# Patient Record
Sex: MALE | Race: WHITE | NOT HISPANIC OR LATINO | Employment: FULL TIME | URBAN - METROPOLITAN AREA
[De-identification: names, ages, dates, MRNs, and addresses within clinical notes are randomized per-mention and may not be internally consistent; named-entity substitution may affect disease eponyms.]

---

## 2017-10-20 ENCOUNTER — LAB REQUISITION (OUTPATIENT)
Dept: LAB | Facility: OTHER | Age: 27
End: 2017-10-20

## 2017-10-20 DIAGNOSIS — Z00.00 ANNUAL PHYSICAL EXAM: ICD-10-CM

## 2017-10-30 ENCOUNTER — LAB REQUISITION (OUTPATIENT)
Dept: LAB | Facility: OTHER | Age: 27
End: 2017-10-30

## 2017-10-30 DIAGNOSIS — Z77.011 EXPOSURE TO LEAD: ICD-10-CM

## 2017-10-30 PROCEDURE — 83655 ASSAY OF LEAD: CPT | Performed by: FAMILY MEDICINE

## 2017-11-01 LAB — LEAD BLD-MCNC: 1 UG/DL (ref 0–19)

## 2018-02-07 ENCOUNTER — TRANSCRIBE ORDERS (OUTPATIENT)
Dept: PHYSICAL THERAPY | Facility: CLINIC | Age: 28
End: 2018-02-07

## 2018-02-07 DIAGNOSIS — S46.912D STRAIN OF LEFT SHOULDER, SUBSEQUENT ENCOUNTER: Primary | ICD-10-CM

## 2018-02-08 ENCOUNTER — TREATMENT (OUTPATIENT)
Dept: PHYSICAL THERAPY | Facility: CLINIC | Age: 28
End: 2018-02-08

## 2018-02-08 DIAGNOSIS — S46.912D STRAIN OF LEFT SHOULDER, SUBSEQUENT ENCOUNTER: Primary | ICD-10-CM

## 2018-02-08 PROCEDURE — 97035 APP MDLTY 1+ULTRASOUND EA 15: CPT | Performed by: PHYSICAL THERAPIST

## 2018-02-08 PROCEDURE — 97001 PR PHYS THERAPY EVALUATION: CPT | Performed by: PHYSICAL THERAPIST

## 2018-02-08 PROCEDURE — 97110 THERAPEUTIC EXERCISES: CPT | Performed by: PHYSICAL THERAPIST

## 2018-02-08 NOTE — PATIENT INSTRUCTIONS
Handout given for HEP.  Discussed eval, POC, strain vs internal derangement, exercises and modalities with patient.

## 2018-02-08 NOTE — PROGRESS NOTES
Physical Therapy Initial Evaluation and Plan of Care    Time In  136  Time Out  208    Subjective Evaluation    History of Present Illness  Date of onset: 2018  Mechanism of injury: A gang box door fell and patient went to catch it , patient caught it with his arm extended overhead.      Patient Occupation: [employer]   Precautions and Work Restrictions: light dutyQuality of life: good    Pain  Current pain ratin  At worst pain ratin  Location: right superior shoulder  Quality: sharp  Relieving factors: rest  Aggravating factors: movement  Progression: improved    Hand dominance: right    Patient Goals  Patient goal: get back to normal           Objective       Palpation     Additional Palpation Details  TTP to left LHB tendon, SS tendon and AC joint.     Active Range of Motion   Left Shoulder   Flexion: 115 degrees with pain  Abduction: 68 degrees with pain  External rotation 0°: 37 degrees   Internal rotation BTB: Active internal rotation behind the back: WNL.     Strength/Myotome Testing     Left Shoulder     Planes of Motion   Flexion: 4   Abduction: 4   External rotation at 0°: 3+   Internal rotation at 0°: 4     Additional Strength Details  Pain with all planes    Tests     Left Shoulder   Positive active compression ([place]), empty can and Hawkin's.          Assessment & Plan     Assessment  Impairments: abnormal or restricted ROM, activity intolerance, impaired physical strength, lacks appropriate home exercise program and pain with function  Assessment details: Patient presents with c/o pain, TTP, limited ROM, decreased strength and positive special testing which is limiting his ability to perform full job duties and ADL'S.  Barriers to therapy: none  Prognosis: good  Prognosis details: STG's to be met by 2 weeks  1)  Independent with HEP  2)  Decrease pain by 50% or more  3)  AROM WNL for left shoulder    LTG's to be met by 4 weeks  1)  Independent with HEP progression  2)   Decrease pain by 75% or more  3)  Increase strength for left shoulder to 4+/5  4)  Negative special testing  5)  No TTP to left shoulder  6)  Patient to lift waist to shoulder up to 50 lbs  7)  Patient to push/pull up to 100 lbs  8)  Patient to return to ADL'S without limitations       Plan  Planned modality interventions: TENS, cryotherapy, ultrasound and iontophoresis  Planned therapy interventions: strengthening, stretching, therapeutic activities, home exercise program and manual therapy  Frequency: 3x week  Duration in weeks: 4  Treatment plan discussed with: patient        Manual Therapy:    0     mins  10537;  Therapeutic Exercise:    13     mins  04715;    Neuromuscular Renita:    0    mins  68460;    Therapeutic Activity:     0     mins  92439;     Gait Trainin     mins  24318;     Ultrasound:     8     mins  58087;    Work Hardening           0      mins 67140  Iontophoresis               2   mins 43829    Timed Treatment:   23   mins   Total Treatment:     32   mins    PT SIGNATURE: Garland Doyle, PT   DATE TREATMENT INITIATED: 2018    Initial Certification  Certification Period: 2018  I certify that the therapy services are furnished while this patient is under my care.  The services outlined above are required by this patient, and will be reviewed every 90 days.     PHYSICIAN: Kylah Lewis, APRN      DATE:     Please sign and return via fax to [phone].. Thank you, [facility].

## 2018-02-13 ENCOUNTER — TREATMENT (OUTPATIENT)
Dept: PHYSICAL THERAPY | Facility: CLINIC | Age: 28
End: 2018-02-13

## 2018-02-13 DIAGNOSIS — S46.912D STRAIN OF LEFT SHOULDER, SUBSEQUENT ENCOUNTER: Primary | ICD-10-CM

## 2018-02-13 PROCEDURE — 97110 THERAPEUTIC EXERCISES: CPT | Performed by: PHYSICAL THERAPIST

## 2018-02-13 NOTE — PROGRESS NOTES
"Physical Therapy Daily Progress Note            Subjective Evaluation    History of Present Illness    Subjective comment: patient reports 2-3/10 pain rating.  \"It is getting better\"       Objective   See Exercise, Manual, and Modality Logs for complete treatment.       Assessment & Plan     Assessment  Assessment details: Patient tolerated treatment well.  Was able to lift his arm overhead with minimal pain.  Still has considerable pain with MMT in ER.      Plan  Plan details: Progress as tolerated.                      Therapeutic Exercise:    30    mins  31299;       Timed Treatment:   30   mins   Total Treatment:     30   mins    Rebeka Cline, PT  Physical Therapist  "

## 2018-02-16 ENCOUNTER — TREATMENT (OUTPATIENT)
Dept: PHYSICAL THERAPY | Facility: CLINIC | Age: 28
End: 2018-02-16

## 2018-02-16 DIAGNOSIS — S46.912D STRAIN OF LEFT SHOULDER, SUBSEQUENT ENCOUNTER: Primary | ICD-10-CM

## 2018-02-16 PROCEDURE — 97035 APP MDLTY 1+ULTRASOUND EA 15: CPT | Performed by: PHYSICAL THERAPIST

## 2018-02-16 PROCEDURE — 97140 MANUAL THERAPY 1/> REGIONS: CPT | Performed by: PHYSICAL THERAPIST

## 2018-02-16 PROCEDURE — 97110 THERAPEUTIC EXERCISES: CPT | Performed by: PHYSICAL THERAPIST

## 2018-02-16 NOTE — PROGRESS NOTES
Physical Therapy Daily Progress Note    Time In 330  Time Out 411        Subjective  Patient reports that the shoulder is getting better, rates the pain at 1/10.    Objective   See Exercise, Manual, and Modality Logs for complete treatment.       Assessment/Plan  Subjective reports are much improved.  PROM was WNL for flexion, abduction and IR, but patient had tightness and some pain with ER.  Patient tolerated the progression of exercises without c/o pain in the shoulder.                     Manual Therapy:    8     mins  73265;  Therapeutic Exercise:    23     mins  02553;     Neuromuscular Renita:    0    mins  15908;    Therapeutic Activity:     0     mins  87185;     Gait Trainin     mins  23041;     Ultrasound:     8     mins  13178;    Work Hardening           0      mins 86621  Iontophoresis               2   mins 62825    Timed Treatment:   41   mins   Total Treatment:     41   mins    Garland Doyle, PT  Physical Therapist

## 2018-02-20 ENCOUNTER — TREATMENT (OUTPATIENT)
Dept: PHYSICAL THERAPY | Facility: CLINIC | Age: 28
End: 2018-02-20

## 2018-02-20 DIAGNOSIS — S46.912D STRAIN OF LEFT SHOULDER, SUBSEQUENT ENCOUNTER: Primary | ICD-10-CM

## 2018-02-20 PROCEDURE — 97110 THERAPEUTIC EXERCISES: CPT | Performed by: PHYSICAL THERAPIST

## 2018-02-20 NOTE — PROGRESS NOTES
Re-Assessment / Re-Certification        Patient: Spike Delgado   : 1990  Diagnosis/ICD-10 Code:  Strain of left shoulder, subsequent encounter [S46.912D]  Referring practitioner: Kylah Lewis,*  Date of Initial Visit: 2018  Today's Date: 2018  Patient seen for 4 sessions      Subjective:   Spike Delgado reports: Patient reports 0-1/10 pain rating with 90% improvement.  Still has pain with some ER movement but able to perform daily activities and light duty activities without a problem.     Clinical Progress: improved  Home Program Compliance: Yes  Treatment has included: therapeutic exercise and manual therapy    Subjective   Objective       Active Range of Motion   Left Shoulder   Flexion: 175 degrees   Abduction: 175 degrees   External rotation 0°: 90 degrees   Internal rotation BTB: Active internal rotation behind the back: WNL.     Strength/Myotome Testing     Left Shoulder     Planes of Motion   Flexion: 4+   Abduction: 4+   External rotation at 0°: 4-   Internal rotation at 0°: 4+     Tests     Left Shoulder   Negative active compression ([place]), empty can and Hawkin's.     Additional Tests Details  Functional Testing:  Horizontal Liftin#  Ladder Climbin times  Sustained Overhead work on bolt box:  5 minutes     Assessment & Plan     Assessment  Assessment details: Patient tolerated treatment well.  Has full AROM of left shoulder and negative special testing. Still has mild deficit in weakness in left shoulder compared to right .  Report mild pain with testing MMT in ER.  Introduced functional lifting at today's session.  Was able to lift up to 70# with no difficulty.   Prognosis details: STG's to be met by 2 weeks  1)  Independent with HEP  MET  2)  Decrease pain by 50% or more  MET  3)  AROM WNL for left shoulder  MET    LTG's to be met by 4 weeks  1)  Independent with HEP progression  MET  2)  Decrease pain by 75% or more  MET  3)  Increase strength for left  shoulder to 4+/5 MET  4)  Negative special testing MET  5)  No TTP to left shoulder MET  6)  Patient to lift waist to shoulder up to 50 lbs MET  7)  Patient to push/pull up to 100 lbs NOT TESTED  8)  Patient to return to ADL'S without limitations MET      Progress toward previous goals: All Met          PT Signature: Rebeka Cline, PT          Therapeutic Exercise:    55     mins  41218;       Timed Treatment:   55   mins   Total Treatment:     55   mins

## 2018-06-22 ENCOUNTER — DOCUMENTATION (OUTPATIENT)
Dept: PHYSICAL THERAPY | Facility: CLINIC | Age: 28
End: 2018-06-22

## 2018-06-22 NOTE — PROGRESS NOTES
Discharge Summary  Discharge Summary from Physical Therapy Report      Date of Initial Visit: 02/08/2018  Last day of service:  02/20/2018  Patient seen for 4 sessions      Discharge Status of Patient: See MD Note dated 02/20/2018    Goals: All Met    Discharge Plan: Return to Full Duty                Rebeka Cline, PT  Physical Therapist